# Patient Record
Sex: MALE | Race: WHITE
[De-identification: names, ages, dates, MRNs, and addresses within clinical notes are randomized per-mention and may not be internally consistent; named-entity substitution may affect disease eponyms.]

---

## 2018-07-24 ENCOUNTER — HOSPITAL ENCOUNTER (EMERGENCY)
Dept: HOSPITAL 31 - C.ER | Age: 43
LOS: 1 days | Discharge: HOME | End: 2018-07-25
Payer: SELF-PAY

## 2018-07-24 VITALS
RESPIRATION RATE: 18 BRPM | TEMPERATURE: 98.1 F | HEART RATE: 84 BPM | SYSTOLIC BLOOD PRESSURE: 158 MMHG | DIASTOLIC BLOOD PRESSURE: 96 MMHG | OXYGEN SATURATION: 98 %
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
## 2018-07-24 DIAGNOSIS — S93.401A: Primary | ICD-10-CM

## 2018-07-25 NOTE — RAD
Date of service: 



07/24/2018



PROCEDURE:  Right Foot Radiographs.



HISTORY:

r/o fx  



COMPARISON:

None.



FINDINGS:



BONES:

No acute fracture or destructive bony lesion identified.



JOINTS:

No subluxation or dislocation identified. 



SOFT TISSUES:

Normal. 



OTHER FINDINGS:

None.



IMPRESSION:

Unremarkable right foot radiographs.

## 2018-07-25 NOTE — C.PDOC
History Of Present Illness


The patient reports that he slipped from the second story and twisted the right 

ankle at 6pm today. Now complains of pain and swelling to the right ankle. The 

patient reports that he was not able to ambulate after the injury. denies 

numbess, weakness, or other injuries. 


Time Seen by Provider: 07/24/18 22:40


Chief Complaint (Nursing): Lower Extremity Problem/Injury


History Per: Patient


History/Exam Limitations: no limitations


Severity: Mild


Pain Scale Rating Of: 5





Past Medical History


Vital Signs: 


 Last Vital Signs











Temp  98.1 F   07/24/18 22:33


 


Pulse  84   07/24/18 22:33


 


Resp  18   07/24/18 22:33


 


BP  158/96 H  07/24/18 22:33


 


Pulse Ox  98   07/25/18 05:13














- Medical History


PMH: HTN (NO MEDS PER PT)


   Denies: Chronic Kidney Disease


Family History: States: No Known Family Hx





- Social History


Hx Tobacco Use: No


Hx Alcohol Use: Yes


Hx Substance Use: No





- Immunization History


Hx Tetanus Toxoid Vaccination: No


Hx Influenza Vaccination: No


Hx Pneumococcal Vaccination: No





Review Of Systems


Constitutional: Negative for: Fever, Weakness


Cardiovascular: Negative for: Chest Pain


Respiratory: Negative for: Shortness of Breath


Gastrointestinal: Negative for: Abdominal Pain


Musculoskeletal: Positive for: Foot Pain


Skin: Negative for: Rash, Bruising


Neurological: Negative for: Weakness, Numbness





Physical Exam





- Physical Exam


Appears: Non-toxic, No Acute Distress


Skin: Warm, No Rash


Head: Atraumatic, Normacephalic


Eye(s): bilateral: Normal Inspection, PERRL, EOMI


Oral Mucosa: Moist


Neck: Normal ROM


Extremity: Normal ROM, Capillary Refill (< 2 sec), Swelling ((+) moderate 

swelling and tenderness to the lateral malleolus)


Pulses: Left Dorsalis Pedis: Normal, Right Dorsalis Pedis: Normal


Neurological/Psych: Oriented x3, Normal Speech, Normal Motor, Normal Sensation


Gait: Steady





ED Course And Treatment


O2 Sat by Pulse Oximetry: 98 (on RA)


Pulse Ox Interpretation: Normal





- Other Rad


  ** ankle/foot


Interpretation: Moderate swelling. No acute fracture or dislocation.





Medical Decision Making


Medical Decision Making: 


will splint the leg as there may be an occult. Posterior Splint applied by ED 

tech and checked by me. Crutch training given. 





Disposition





- Disposition


Referrals: 


Yessy Kingston MD [Staff Provider] - 


Disposition: HOME/ ROUTINE


Disposition Time: 00:36


Condition: GOOD


Additional Instructions: 


Follow up with the Orthopedist doctor/clinic within 1-2 days. Return if 

worsened. 


Prescriptions: 


Acetaminophen [Tylenol] 325 mg PO Q6 PRN #30 tab


 PRN Reason: Pain, Mild (1-3)


Ibuprofen [Motrin] 1 tab PO TID PRN #30 tab


 PRN Reason: Pain


Instructions:  Ankle Sprain (DC)


Forms:  [employer] (English), Work Excuse


Print Language: German





- Clinical Impression


Clinical Impression: 


 Ankle sprain

## 2018-07-25 NOTE — RAD
Date of service: 



07/24/2018



PROCEDURE:  Right Ankle Radiographs.



HISTORY:

r/o fx  



COMPARISON:

None



FINDINGS:



BONES:

No acute fracture or destructive bony lesion identified.



JOINTS:

No subluxation or dislocation.  Ankle mortise appears normal.



SOFT TISSUES:

Limited soft tissue edema is seen at the anterior ankle. 



OTHER FINDINGS:

None.



IMPRESSION:

No acute fracture or dislocation identified.  Limited edema is seen 

anterior ankle soft tissues.